# Patient Record
Sex: FEMALE | Race: WHITE | NOT HISPANIC OR LATINO | Employment: FULL TIME | URBAN - METROPOLITAN AREA
[De-identification: names, ages, dates, MRNs, and addresses within clinical notes are randomized per-mention and may not be internally consistent; named-entity substitution may affect disease eponyms.]

---

## 2024-07-05 ENCOUNTER — OFFICE VISIT (OUTPATIENT)
Dept: URGENT CARE | Age: 54
End: 2024-07-05
Payer: COMMERCIAL

## 2024-07-05 VITALS — HEART RATE: 86 BPM | TEMPERATURE: 97.4 F | SYSTOLIC BLOOD PRESSURE: 132 MMHG | DIASTOLIC BLOOD PRESSURE: 68 MMHG

## 2024-07-05 DIAGNOSIS — Z23 NEED FOR PROPHYLACTIC VACCINATION AGAINST RABIES: Primary | ICD-10-CM

## 2024-07-05 PROCEDURE — 90675 RABIES VACCINE IM: CPT

## 2024-07-05 PROCEDURE — 90471 IMMUNIZATION ADMIN: CPT

## 2024-07-05 NOTE — PROGRESS NOTES
[facility]        NAME: Arabella Gonsalez is a 53 y.o. female  : 1970    MRN: [id number]  DATE: 2024  TIME: 9:59 AM    Assessment and Plan   Need for prophylactic vaccination against rabies [Z23]  1. Need for prophylactic vaccination against rabies  Rabies vaccine IM (human diploid, IMOVAX)            Patient Instructions       Follow up with PCP in 3-5 days.  Proceed to  ER if symptoms worsen.    If tests have been performed at [facility], our office will contact you with results if changes need to be made to the care plan discussed with you at the visit.  You can review your full results on [facility].    Chief Complaint     Chief Complaint   Patient presents with    Immunizations     Patient is here for 2nd rabies vaccine. Reports no changes.          History of Present Illness       Presents for 2nd rabies vaccine- scratched/bitten by cat- healing WNL        Review of Systems   Review of Systems   Skin:  Positive for wound.   All other systems reviewed and are negative.        Current Medications     No current outpatient medications on file.    Current Allergies     Allergies as of 2024    (Not on File)            The following portions of the patient's history were reviewed and updated as appropriate: allergies, current medications, past family history, past medical history, past social history, past surgical history and problem list.     No past medical history on file.    No past surgical history on file.    No family history on file.      Medications have been verified.        Objective   There were no vitals taken for this visit.  No LMP recorded.       Physical Exam     Physical Exam  Vitals reviewed.   Constitutional:       Appearance: Normal appearance.   Musculoskeletal:         General: Normal range of motion.   Skin:     Comments: Right inner arm- healing car scratch/bite   Neurological:      Mental Status: She is alert.

## 2024-07-09 ENCOUNTER — OFFICE VISIT (OUTPATIENT)
Dept: URGENT CARE | Age: 54
End: 2024-07-09
Payer: COMMERCIAL

## 2024-07-09 VITALS — DIASTOLIC BLOOD PRESSURE: 64 MMHG | TEMPERATURE: 97.1 F | SYSTOLIC BLOOD PRESSURE: 126 MMHG

## 2024-07-09 DIAGNOSIS — Z23 NEED FOR PROPHYLACTIC VACCINATION AGAINST RABIES: Primary | ICD-10-CM

## 2024-07-09 PROCEDURE — 90471 IMMUNIZATION ADMIN: CPT | Performed by: EMERGENCY MEDICINE

## 2024-07-09 PROCEDURE — 90675 RABIES VACCINE IM: CPT

## 2024-07-09 PROCEDURE — S9083 URGENT CARE CENTER GLOBAL: HCPCS | Performed by: EMERGENCY MEDICINE

## 2024-07-09 PROCEDURE — G0382 LEV 3 HOSP TYPE B ED VISIT: HCPCS | Performed by: EMERGENCY MEDICINE

## 2024-07-09 NOTE — PROGRESS NOTES
[facility]        NAME: Arabella Gonsalez is a 53 y.o. female  : 1970    MRN: [id number]  DATE: 2024  TIME: 3:15 PM    Assessment and Plan   Need for prophylactic vaccination against rabies [Z23]  1. Need for prophylactic vaccination against rabies  Rabies vaccine IM (human diploid, IMOVAX)            Patient Instructions       Follow up with PCP in 3-5 days.  Proceed to  ER if symptoms worsen.    If tests have been performed at [facility], our office will contact you with results if changes need to be made to the care plan discussed with you at the visit.  You can review your full results on [facility].    Chief Complaint     Chief Complaint   Patient presents with    Immunizations     Patient is here for 3rd vaccine, reports no change or fever.          History of Present Illness       53-year-old female presents for third rabies vaccination series and wound check.  Patient states that she was bit by her cat in the right forearm on 2024.  Patient not previously vaccinated against rabies, she received rabies immunoglobulin and the first rabies vaccine on that date.  Patient states that she has been tolerating the vaccination series well without complication.  She denies fever, rigors, wheezing, difficulty breathing, shortness of breath, nausea vomiting or diarrhea.    Wound Check  She was originally treated 5 to 10 days ago. Previous treatment included oral antibiotics. There has been no drainage from the wound. There is no redness present. There is no swelling present. There is no pain present. She has no difficulty moving the affected extremity or digit.       Review of Systems   Review of Systems   Constitutional:  Negative for activity change, appetite change, chills, diaphoresis, fatigue, fever and unexpected weight change.   HENT:  Negative for ear pain and sore throat.    Eyes:  Negative for pain and visual disturbance.   Respiratory:  Negative for cough and shortness of  breath.    Cardiovascular:  Negative for chest pain and palpitations.   Gastrointestinal:  Negative for abdominal pain and vomiting.   Genitourinary:  Negative for dysuria and hematuria.   Musculoskeletal:  Negative for arthralgias and back pain.   Skin:  Negative for color change and rash.   Neurological:  Negative for dizziness, tremors, seizures, syncope, facial asymmetry, speech difficulty, weakness, light-headedness, numbness and headaches.   All other systems reviewed and are negative.        Current Medications     No current outpatient medications on file.    Current Allergies     Allergies as of 07/09/2024    (Not on File)            The following portions of the patient's history were reviewed and updated as appropriate: allergies, current medications, past family history, past medical history, past social history, past surgical history and problem list.     No past medical history on file.    No past surgical history on file.    No family history on file.      Medications have been verified.        Objective   /64   Temp (!) 97.1 °F (36.2 °C) (Tympanic)   No LMP recorded.       Physical Exam     Physical Exam  Vitals and nursing note reviewed.   Constitutional:       General: She is not in acute distress.     Appearance: Normal appearance. She is normal weight. She is not ill-appearing, toxic-appearing or diaphoretic.   HENT:      Head: Normocephalic and atraumatic.      Right Ear: External ear normal.      Left Ear: External ear normal.      Nose: Nose normal.   Eyes:      Extraocular Movements: Extraocular movements intact.   Cardiovascular:      Rate and Rhythm: Normal rate and regular rhythm.      Pulses: Normal pulses.           Carotid pulses are 2+ on the right side and 2+ on the left side.       Radial pulses are 2+ on the right side and 2+ on the left side.   Pulmonary:      Effort: Pulmonary effort is normal.      Breath sounds: Normal breath sounds.   Abdominal:      General: Abdomen is  flat.      Palpations: Abdomen is soft.   Skin:     General: Skin is warm and dry.          Neurological:      General: No focal deficit present.      Mental Status: She is alert and oriented to person, place, and time.      Sensory: No sensory deficit.      Motor: No weakness.   Psychiatric:         Mood and Affect: Mood normal.         Behavior: Behavior normal.

## 2024-07-13 ENCOUNTER — CLINICAL SUPPORT (OUTPATIENT)
Dept: URGENT CARE | Age: 54
End: 2024-07-13
Payer: COMMERCIAL

## 2024-07-13 DIAGNOSIS — Z23 NEED FOR PROPHYLACTIC VACCINATION AGAINST RABIES: Primary | ICD-10-CM

## 2024-07-13 PROCEDURE — 90675 RABIES VACCINE IM: CPT

## 2024-07-13 PROCEDURE — 90471 IMMUNIZATION ADMIN: CPT
